# Patient Record
Sex: FEMALE | Race: WHITE | NOT HISPANIC OR LATINO | Employment: STUDENT | URBAN - METROPOLITAN AREA
[De-identification: names, ages, dates, MRNs, and addresses within clinical notes are randomized per-mention and may not be internally consistent; named-entity substitution may affect disease eponyms.]

---

## 2018-02-03 ENCOUNTER — HOSPITAL ENCOUNTER (EMERGENCY)
Facility: MEDICAL CENTER | Age: 23
End: 2018-02-03
Attending: EMERGENCY MEDICINE
Payer: COMMERCIAL

## 2018-02-03 VITALS
RESPIRATION RATE: 16 BRPM | SYSTOLIC BLOOD PRESSURE: 120 MMHG | BODY MASS INDEX: 25.98 KG/M2 | OXYGEN SATURATION: 100 % | WEIGHT: 191.8 LBS | HEART RATE: 80 BPM | HEIGHT: 72 IN | TEMPERATURE: 98.6 F | DIASTOLIC BLOOD PRESSURE: 70 MMHG

## 2018-02-03 DIAGNOSIS — H21.01 HYPHEMA OF RIGHT EYE: ICD-10-CM

## 2018-02-03 PROCEDURE — 700101 HCHG RX REV CODE 250: Performed by: EMERGENCY MEDICINE

## 2018-02-03 PROCEDURE — 99284 EMERGENCY DEPT VISIT MOD MDM: CPT

## 2018-02-03 RX ORDER — ATROPINE SULFATE 10 MG/ML
1 SOLUTION/ DROPS OPHTHALMIC ONCE
Status: COMPLETED | OUTPATIENT
Start: 2018-02-03 | End: 2018-02-03

## 2018-02-03 RX ORDER — TIMOLOL MALEATE 5 MG/ML
1 SOLUTION/ DROPS OPHTHALMIC ONCE
Status: COMPLETED | OUTPATIENT
Start: 2018-02-03 | End: 2018-02-03

## 2018-02-03 RX ORDER — PREDNISOLONE ACETATE 10 MG/ML
1 SUSPENSION/ DROPS OPHTHALMIC ONCE
Status: COMPLETED | OUTPATIENT
Start: 2018-02-03 | End: 2018-02-03

## 2018-02-03 RX ADMIN — ATROPINE SULFATE 1 DROP: 10 SOLUTION/ DROPS OPHTHALMIC at 17:00

## 2018-02-03 RX ADMIN — TIMOLOL MALEATE 1 DROP: 5 SOLUTION OPHTHALMIC at 17:00

## 2018-02-03 RX ADMIN — PREDNISOLONE ACETATE 1 DROP: 10 SUSPENSION/ DROPS OPHTHALMIC at 17:00

## 2018-02-03 ASSESSMENT — PAIN SCALES - GENERAL: PAINLEVEL_OUTOF10: 10

## 2018-02-03 NOTE — ED PROVIDER NOTES
"ED Provider Note    CHIEF COMPLAINT  Chief Complaint   Patient presents with   • Eye Pain     Right x 30-40 min.  Pt reports she was hit in her right eye with another players hand while playing basketball.         HPI  Sherrill Castillo is a 23 y.o. female who presents with right eye pain. She was playing basketball and put in the eye by another player. She has some blurry vision in that eye. Otherwise no injuries. No headache no nausea or vomiting.    REVIEW OF SYSTEMS  Positive for right eye trauma, slight blurry vision, negative for other injuries.     PAST MEDICAL HISTORY       SOCIAL HISTORY  Social History     Social History Main Topics   • Smoking status: Never Smoker   • Smokeless tobacco: Never Used   • Alcohol use No   • Drug use: No   • Sexual activity: Not on file       SURGICAL HISTORY  patient denies any surgical history    CURRENT MEDICATIONS  Home Medications     Reviewed by Elsa Acevedo R.N. (Registered Nurse) on 02/03/18 at 1623  Med List Status: Complete   Medication Last Dose Status        Patient Clyde Taking any Medications                       ALLERGIES  Allergies   Allergen Reactions   • Food      \"all nuts\"        PHYSICAL EXAM  VITAL SIGNS: /72   Pulse 82   Temp 37.2 °C (98.9 °F) (Temporal)   Resp 14   Ht 1.854 m (6' 1\")   Wt 87 kg (191 lb 12.8 oz)   SpO2 100%   BMI 25.30 kg/m²   Constitutional: Alert in no apparent distress. Well apearing  HENT: Normocephalic, Atraumatic, Bilateral external ears normal. Nose normal.   Eyes:  Right conjunctiva injected with approximately 30% hyphema.  Lungs: Non-labored respirations  Skin: Warm, Dry, No erythema, No rash.   Neurologic: Alert, Grossly non-focal.   Psychiatric: Affect normal, Judgment normal, Mood normal, Appears appropriate and not intoxicated.       DIAGNOSTIC STUDIES / PROCEDURES    Slit lamp exam:  Pressures the left eye was an average of 13-16 the right eye and average of 20.  Diffuse flourescein uptake of the right " eye    COURSE & MEDICAL DECISION MAKING  Pertinent Labs & Imaging studies reviewed. (See chart for details)  This is a 23-year-old female that presents with right eye trauma. She does have us hyphema and the right eye. Her pressures were okay in that eye with an average of 20. I spoke with Dr. Rea from ophthalmology who recommended atropine 1% twice a day, Pred forte 4 times a day and Timolol twice a day. She is to follow-up with an ophthalmologist tomorrow. She is understanding of this plan and will be discharged.     The patient will return for new or worsening symptoms and is stable at the time of discharge. Patient was given return precautions. Patient and/or family member verbalizes understanding and will comply.    DISPOSITION:  Patient will be discharged home in stable condition.    FOLLOW UP:  [facility], Emergency Dept  [facility] [facility] [id number] [phone]    return for worsening pain vomiting or other concerns.    ophthalmology      you need to be seen by an eye doctor tomorrow        OUTPATIENT MEDICATIONS:  New Prescriptions    No medications on file       FINAL IMPRESSION  1. Hyphema of right eye        2.   3.     This dictation has been creating using voice recognition software. The accuracy of the dictation is limited the abilities of the software.  I expect there may be some errors of grammar and possibly content. I made every attempt to manually correct the errors within my dictation. However errors related to this voice recognition software may still exist and should be interpreted within the appropriate context.        The note accurately reflects work and decisions made by me.  Karla Mcclendon  2/3/2018  4:46 PM

## 2018-02-03 NOTE — ED TRIAGE NOTES
Chief Complaint   Patient presents with   • Eye Pain     Right x 30-40 min.  Pt reports she was hit in her right eye with another players hand while playing basketball.     Pt to triage in NAD.  Pt educated on triage process and instructed to notify triage RN of any change in status.

## 2018-02-04 NOTE — DISCHARGE INSTRUCTIONS
"Take the atropine twice daily, the Pred-forte 4 times a day, and the atenolol twice a day. You need to be seen by ophthalmology tomorrow        Hyphema  A hyphema is bleeding in the front chamber of the eye, inside the eye itself, between the cornea (clear outer covering of the eye) and the iris (colored part of the eye). It may occur from any form of injury to the eye. It may also occur on its own (spontaneously) in certain medical conditions.  Because of gravity, the blood generally settles to the lower part of the eye. This creates a clear red area, with a \"fluid level\" or straight top, which is clearly visible when looking at the eye. Very small hyphemas may only be visible to an eye specialist, when doing an examination with special tools. Very large hyphemas may completely fill the front chamber, so that the colored part of the eye cannot be seen at all. This is often called an \"8 Ball\" or \"Grade 4\" hyphema. It is a dangerous and often painful condition, that must be treated immediately.  CAUSES   The most common cause of a hyphema is injury (trauma). In some cases, even a very mild blow to the eye can result in a hyphema. Any condition that makes a patient more likely to bleed can also cause a hyphema. Examples include:  · Diseases that prevent normal blood clotting (hemophilia, blood disorders, low platelet count).  · Use of anti-coagulant drugs or blood thinners (Heparin, Coumadin).  · Overuse of aspirin, or similar medicines.  · Diabetes.  · Recent eye surgery.  SYMPTOMS   · A very small (microscopic) hyphema may cause no symptoms at all, or it may cause slightly blurred vision in the affected eye.  · A hyphema with a fluid level usually causes blurred vision in the affected eye.  · An \"8 Ball\" hyphema causes severe or total loss of vision in the affected eye, and may be very painful.  RISKS AND COMPLICATIONS  · The fluid normally present in the front part of the eye is constantly produced and drained from " "the inside of the eye, by an internal drainage system. When a hyphema occurs, the blood clogs up this drainage system. This may cause a build-up of fluid, resulting in increased pressure inside the eye. This condition is a form of glaucoma (eye disease that involves pressure inside the eyeball).  · \"8 Ball\" hyphemas may clog up the drainage system completely, causing a sharp (acute) and sudden rise in the pressure inside the eye. This is a dangerous and painful condition. \"8 Ball\" hyphemas must be treated as soon as possible. The longer they are present, the greater the danger of permanent damage and vision loss.  · It is important to know that every hyphema has the potential to \"re-bleed\" and become an \"8 Ball\" hyphema. The greatest danger of this happening is between one and two weeks after the hyphema first occurred.  · Chronic, recurring hyphemas can cause scarring inside the eye, which may cause further complications.  TREATMENT   Most hyphemas that are not \"8 Ball\" hyphemas clear up fully on their own. Depending on the amount of blood, it may take several days to a few weeks to clear, with a gradual return of normal vision.   The treatment for these types of hyphemas include:  · Restricted activity or bed rest.  · Stopping all medicines that can increase bleeding (aspirin, blood thinners).  · Drops to enlarge (dilate) the pupil of the injured eye (to prevent internal scarring).  · Close monitoring by your eye specialist, until the hyphema has completely cleared. This is to make sure eye pressure does not increase. Medicine to prevent or treat increased eye pressure may be needed.  The treatment of \"8 Ball\" hyphemas includes:  · Surgery to remove the blood from the front part of the eye.  · Medicine (drops or pills) to control the pressure in the eye.  · A small opening may be made in the iris (colored part) of the eye, to make sure the blood can drain out and that pressure in the eye does not become " dangerously high.  HOME CARE INSTRUCTIONS   · Follow your caregiver's instructions, otherwise more bleeding may occur. This could result in permanent loss of vision.  · Rest in bed as much as possible, for as long as directed by your caregiver. Lie on your back, and use extra pillows to keep your head raised. You may go the bathroom, eat, and bathe while you are up.  · Only take over-the-counter or prescription medicines for pain, discomfort, or fever as directed by your caregiver.  · If you have an eye shield, remove it only to put in your prescribed eye drops, and then replace it over your eye. Do this for as long as directed by your caregiver. This is to help protect your eye from further injury and a possible re-bleed.  · Do not bend forward or lower your head until the hyphema clears up. Do not do lifting or strenuous activities until the hyphema completely clears up, or as directed by your caregiver.  SEEK IMMEDIATE MEDICAL CARE IF:   · Your vision changes in any way, or you develop pain in the affected eye.  · You are unable to see the colored part of your eye, when you could see all or part of it before.  · You feel sick to your stomach (nauseous) or start to vomit.  MAKE SURE YOU:   · Understand these instructions.  · Will watch your condition.  · Will get help right away if you are not doing well or get worse.  Always wear eye protection when involved in any sports or work-related activities that could result in an injury to your eyes.     This information is not intended to replace advice given to you by your health care provider. Make sure you discuss any questions you have with your health care provider.     Document Released: 03/26/2002 Document Revised: 03/11/2013 Document Reviewed: 10/28/2010  [employer] Patient Education ©2016 [employer]